# Patient Record
Sex: FEMALE | Race: BLACK OR AFRICAN AMERICAN
[De-identification: names, ages, dates, MRNs, and addresses within clinical notes are randomized per-mention and may not be internally consistent; named-entity substitution may affect disease eponyms.]

---

## 2020-04-07 ENCOUNTER — HOSPITAL ENCOUNTER (OUTPATIENT)
Dept: HOSPITAL 76 - LAB.WCP | Age: 30
Discharge: HOME | End: 2020-04-07
Attending: ADVANCED PRACTICE MIDWIFE
Payer: MEDICAID

## 2020-04-07 DIAGNOSIS — Z3A.00: ICD-10-CM

## 2020-04-07 DIAGNOSIS — O20.0: Primary | ICD-10-CM

## 2020-04-07 PROCEDURE — 84702 CHORIONIC GONADOTROPIN TEST: CPT

## 2020-04-07 PROCEDURE — 36415 COLL VENOUS BLD VENIPUNCTURE: CPT

## 2020-04-09 ENCOUNTER — HOSPITAL ENCOUNTER (OUTPATIENT)
Dept: HOSPITAL 76 - LAB.WCP | Age: 30
Discharge: HOME | End: 2020-04-09
Attending: ADVANCED PRACTICE MIDWIFE
Payer: MEDICAID

## 2020-04-09 DIAGNOSIS — Z3A.00: ICD-10-CM

## 2020-04-09 DIAGNOSIS — O20.0: Primary | ICD-10-CM

## 2020-04-09 PROCEDURE — 36415 COLL VENOUS BLD VENIPUNCTURE: CPT

## 2020-04-09 PROCEDURE — 84702 CHORIONIC GONADOTROPIN TEST: CPT

## 2020-04-10 ENCOUNTER — HOSPITAL ENCOUNTER (EMERGENCY)
Dept: HOSPITAL 76 - ED | Age: 30
Discharge: HOME | End: 2020-04-10
Payer: MEDICAID

## 2020-04-10 VITALS — DIASTOLIC BLOOD PRESSURE: 75 MMHG | SYSTOLIC BLOOD PRESSURE: 125 MMHG

## 2020-04-10 DIAGNOSIS — O03.4: Primary | ICD-10-CM

## 2020-04-10 LAB
ALBUMIN DIAFP-MCNC: 4.6 G/DL (ref 3.2–5.5)
ALBUMIN/GLOB SERPL: 1.5 {RATIO} (ref 1–2.2)
ALP SERPL-CCNC: 63 IU/L (ref 42–121)
ALT SERPL W P-5'-P-CCNC: 15 IU/L (ref 10–60)
ANION GAP SERPL CALCULATED.4IONS-SCNC: 8 MMOL/L (ref 6–13)
AST SERPL W P-5'-P-CCNC: 21 IU/L (ref 10–42)
BASOPHILS NFR BLD AUTO: 0.1 10^3/UL (ref 0–0.1)
BASOPHILS NFR BLD AUTO: 0.4 %
BILIRUB BLD-MCNC: 1 MG/DL (ref 0.2–1)
BUN SERPL-MCNC: 10 MG/DL (ref 6–20)
CALCIUM UR-MCNC: 8.7 MG/DL (ref 8.5–10.3)
CHLORIDE SERPL-SCNC: 107 MMOL/L (ref 101–111)
CLARITY UR REFRACT.AUTO: (no result)
CO2 SERPL-SCNC: 21 MMOL/L (ref 21–32)
CREAT SERPLBLD-SCNC: 0.7 MG/DL (ref 0.4–1)
EOSINOPHIL # BLD AUTO: 0 10^3/UL (ref 0–0.7)
EOSINOPHIL NFR BLD AUTO: 0.1 %
ERYTHROCYTE [DISTWIDTH] IN BLOOD BY AUTOMATED COUNT: 14.3 % (ref 12–15)
GLOBULIN SER-MCNC: 3.1 G/DL (ref 2.1–4.2)
GLUCOSE SERPL-MCNC: 119 MG/DL (ref 70–100)
GLUCOSE UR QL STRIP.AUTO: NEGATIVE MG/DL
HGB UR QL STRIP: 12.9 G/DL (ref 12–16)
KETONES UR QL STRIP.AUTO: NEGATIVE MG/DL
LIPASE SERPL-CCNC: 48 U/L (ref 22–51)
LYMPHOCYTES # SPEC AUTO: 1.7 10^3/UL (ref 1.5–3.5)
LYMPHOCYTES NFR BLD AUTO: 12.4 %
MCH RBC QN AUTO: 28.4 PG (ref 27–31)
MCHC RBC AUTO-ENTMCNC: 33.2 G/DL (ref 32–36)
MCV RBC AUTO: 85.7 FL (ref 81–99)
MONOCYTES # BLD AUTO: 0.7 10^3/UL (ref 0–1)
MONOCYTES NFR BLD AUTO: 5 %
NEUTROPHILS # BLD AUTO: 11.3 10^3/UL (ref 1.5–6.6)
NEUTROPHILS # SNV AUTO: 13.8 X10^3/UL (ref 4.8–10.8)
NEUTROPHILS NFR BLD AUTO: 81.4 %
NITRITE UR QL STRIP.AUTO: NEGATIVE
PDW BLD AUTO: 10.3 FL (ref 7.9–10.8)
PH UR STRIP.AUTO: 8.5 PH (ref 5–7.5)
PLATELET # BLD: 287 10^3/UL (ref 130–450)
PROT SPEC-MCNC: 7.7 G/DL (ref 6.7–8.2)
PROT UR STRIP.AUTO-MCNC: NEGATIVE MG/DL
RBC # UR STRIP.AUTO: (no result) /UL
RBC # URNS HPF: (no result) /HPF (ref 0–5)
RBC MAR: 4.54 10^6/UL (ref 4.2–5.4)
SODIUM SERPLBLD-SCNC: 136 MMOL/L (ref 135–145)
SP GR UR STRIP.AUTO: 1.01 (ref 1–1.03)
SQUAMOUS URNS QL MICRO: (no result)
UROBILINOGEN UR QL STRIP.AUTO: (no result) E.U./DL
UROBILINOGEN UR STRIP.AUTO-MCNC: NEGATIVE MG/DL

## 2020-04-10 PROCEDURE — 96361 HYDRATE IV INFUSION ADD-ON: CPT

## 2020-04-10 PROCEDURE — 87086 URINE CULTURE/COLONY COUNT: CPT

## 2020-04-10 PROCEDURE — 83690 ASSAY OF LIPASE: CPT

## 2020-04-10 PROCEDURE — 36415 COLL VENOUS BLD VENIPUNCTURE: CPT

## 2020-04-10 PROCEDURE — 86900 BLOOD TYPING SEROLOGIC ABO: CPT

## 2020-04-10 PROCEDURE — 85025 COMPLETE CBC W/AUTO DIFF WBC: CPT

## 2020-04-10 PROCEDURE — 99284 EMERGENCY DEPT VISIT MOD MDM: CPT

## 2020-04-10 PROCEDURE — 86901 BLOOD TYPING SEROLOGIC RH(D): CPT

## 2020-04-10 PROCEDURE — 81003 URINALYSIS AUTO W/O SCOPE: CPT

## 2020-04-10 PROCEDURE — 81001 URINALYSIS AUTO W/SCOPE: CPT

## 2020-04-10 PROCEDURE — 76801 OB US < 14 WKS SINGLE FETUS: CPT

## 2020-04-10 PROCEDURE — 76817 TRANSVAGINAL US OBSTETRIC: CPT

## 2020-04-10 PROCEDURE — 96376 TX/PRO/DX INJ SAME DRUG ADON: CPT

## 2020-04-10 PROCEDURE — 84702 CHORIONIC GONADOTROPIN TEST: CPT

## 2020-04-10 PROCEDURE — 80053 COMPREHEN METABOLIC PANEL: CPT

## 2020-04-10 PROCEDURE — 96374 THER/PROPH/DIAG INJ IV PUSH: CPT

## 2020-04-10 NOTE — ED PHYSICIAN DOCUMENTATION
PD HPI FEMALE 





- Stated complaint


Stated Complaint: FEM 





- Chief complaint


Chief Complaint: Abd Pain





- History obtained from


History obtained from: Patient





- History of Present Illness


Timing - onset: How many days ago (few)


Timing - duration: Days (few)


Timing - details: Abrupt onset, Still present, Waxing and waning


Associated symptoms: Pelvic pain, Vaginal bleeding.  No: Fever, Vaginal 

discharge, Genital sore/lesion, Dysuria


Contributing factors: Pregnant (8 weeks by dates. She does have history of 

endometriosis and has been having issues with getting conception and was excited

about being pregnant.)


OB-GYN History: G (1), P (0)


Similar symptoms before: Has not had sx before


Recently seen: Clinic (She was seen in the clinic a few days ago with the onset 

of some cramping and spotting.  She had a quantitative hCG that was in the 400s.

 She had a repeat 1 yesterday that was 127.  She was having increased cramping 

and pain today and was referred by Dr. camacho to the ER for ultrasound and 

evaluation to ensure no ectopic or intra-abdominal bleeding and to better 

evaluate for the pregnancy status.)





Review of Systems


Constitutional: denies: Fever, Chills


Cardiac: denies: Chest pain / pressure


Respiratory: denies: Dyspnea, Cough


GI: reports: Abdominal Pain, Nausea (with the cramps).  denies: Vomiting, 

Diarrhea


: reports: Now pregnant EGA (8 wks).  denies: Dysuria, Discharge


Neurologic: reports: Generalized weakness.  denies: Near syncope, Syncope





PD PAST MEDICAL HISTORY





- Past Medical History


Cardiovascular: None


Respiratory: None


GYN: Endometriosis





- Present Medications


Home Medications: 


                                Ambulatory Orders











 Medication  Instructions  Recorded  Confirmed


 


Hydrocodone/Acetaminophen [Norco 1 each PO Q6H PRN #15 tablet 04/10/20 





5-325 Tablet]   


 


Naproxen 500 mg PO BID #20 tablet 04/10/20 


 


Ondansetron Odt [Zofran] 4 mg TL Q6H PRN #10 tablet 04/10/20 














- Allergies


Allergies/Adverse Reactions: 


                                    Allergies











Allergy/AdvReac Type Severity Reaction Status Date / Time


 


No Known Drug Allergies Allergy   Verified 04/10/20 11:36














PD ED PE NORMAL





- Vitals


Vital signs reviewed: Yes





- General


General: Alert and oriented X 3, Well developed/nourished, Other (Appears 

anxious and also tearful and in pain.)





- Cardiac


Cardiac: RRR (regular but tachycardic), No murmur





- Respiratory


Respiratory: Clear bilaterally





- Abdomen


Abdomen: Normal bowel sounds, Soft, Non distended, No organomegaly, Other (Some 

tenderness in the suprapubic area but no rebound or percussion tenderness.  The 

upper abdomen is nontender.)





- Female 


Female : Deferred





- Rectal


Rectal: Deferred





- Back


Back: No CVA TTP





- Derm


Derm: Normal color, Warm and dry





- Neuro


Neuro: Alert and oriented X 3, No motor deficit, Normal speech





- Psych


Psych: No: Normal mood (sad and tearful)





Results





- Vitals


Vitals: 


                               Vital Signs - 24 hr











  04/10/20 04/10/20 04/10/20





  11:36 12:01 12:30


 


Temperature 37.1 C  


 


Heart Rate 117 H 110 H 100


 


Respiratory 18  20





Rate   


 


Blood Pressure 140/104 H  115/93 H


 


O2 Saturation 100 99 100














  04/10/20





  15:28


 


Temperature 36.9 C


 


Heart Rate 89


 


Respiratory 





Rate 


 


Blood Pressure 125/75


 


O2 Saturation 100








                                     Oxygen











O2 Source                      Room air

















- Labs


Labs: 


                                Laboratory Tests











  04/10/20 04/10/20 04/10/20





  12:04 12:04 12:04


 


WBC  13.8 H  


 


RBC  4.54  


 


Hgb  12.9  


 


Hct  38.9  


 


MCV  85.7  


 


MCH  28.4  


 


MCHC  33.2  


 


RDW  14.3  


 


Plt Count  287  


 


MPV  10.3  


 


Neut # (Auto)  11.3 H  


 


Lymph # (Auto)  1.7  


 


Mono # (Auto)  0.7  


 


Eos # (Auto)  0.0  


 


Baso # (Auto)  0.1  


 


Absolute Nucleated RBC  0.00  


 


Nucleated RBC %  0.0  


 


Sodium   136 


 


Potassium   3.6 


 


Chloride   107 


 


Carbon Dioxide   21 


 


Anion Gap   8.0 


 


BUN   10 


 


Creatinine   0.7 


 


Estimated GFR (MDRD)   122 


 


Glucose   119 H 


 


Calcium   8.7 


 


Total Bilirubin   1.0 


 


AST   21 


 


ALT   15 


 


Alkaline Phosphatase   63 


 


Total Protein   7.7 


 


Albumin   4.6 


 


Globulin   3.1 


 


Albumin/Globulin Ratio   1.5 


 


Lipase   48 


 


HCG, Quant    79.80


 


Urine Color   


 


Urine Clarity   


 


Urine pH   


 


Ur Specific Gravity   


 


Urine Protein   


 


Urine Glucose (UA)   


 


Urine Ketones   


 


Urine Occult Blood   


 


Urine Nitrite   


 


Urine Bilirubin   


 


Urine Urobilinogen   


 


Ur Leukocyte Esterase   


 


Urine RBC   


 


Urine WBC   


 


Ur Squamous Epith Cells   


 


Urine Bacteria   


 


Ur Microscopic Review   


 


Urine Culture Comments   


 


Blood Type   














  04/10/20 04/10/20





  12:04 12:43


 


WBC  


 


RBC  


 


Hgb  


 


Hct  


 


MCV  


 


MCH  


 


MCHC  


 


RDW  


 


Plt Count  


 


MPV  


 


Neut # (Auto)  


 


Lymph # (Auto)  


 


Mono # (Auto)  


 


Eos # (Auto)  


 


Baso # (Auto)  


 


Absolute Nucleated RBC  


 


Nucleated RBC %  


 


Sodium  


 


Potassium  


 


Chloride  


 


Carbon Dioxide  


 


Anion Gap  


 


BUN  


 


Creatinine  


 


Estimated GFR (MDRD)  


 


Glucose  


 


Calcium  


 


Total Bilirubin  


 


AST  


 


ALT  


 


Alkaline Phosphatase  


 


Total Protein  


 


Albumin  


 


Globulin  


 


Albumin/Globulin Ratio  


 


Lipase  


 


HCG, Quant  


 


Urine Color  YELLOW 


 


Urine Clarity  HAZY 


 


Urine pH  8.5 H 


 


Ur Specific Gravity  1.015 


 


Urine Protein  NEGATIVE 


 


Urine Glucose (UA)  NEGATIVE 


 


Urine Ketones  NEGATIVE 


 


Urine Occult Blood  LARGE H 


 


Urine Nitrite  NEGATIVE 


 


Urine Bilirubin  NEGATIVE 


 


Urine Urobilinogen  0.2 (NORMAL) 


 


Ur Leukocyte Esterase  NEGATIVE 


 


Urine RBC  TNTC H 


 


Urine WBC  0-3 


 


Ur Squamous Epith Cells  RARE Squamous 


 


Urine Bacteria  Rare 


 


Ur Microscopic Review  INDICATED 


 


Urine Culture Comments  NOT INDICATED 


 


Blood Type   O POSITIVE














- Rads (name of study)


  ** OB U/S


Radiology: Prelim report reviewed (OB ultrasound both transabdominal and 

transvaginal showed no adnexal masses nor free fluid.  Blood flow to both 

ovaries was normal.  There was a small fluid sac in the uterus that could be 

consistent with a gestational sac measuring at 5 weeks 2 days.), See rad report





PD MEDICAL DECISION MAKING





- ED course


Complexity details: reviewed results (Ultrasound shows a likely intrauterine 

fetal demise at 5 weeks 2 days which is behind her dates of 8 weeks and would 

correspond with falling quant detailed of hCG levels as a an incomplete 

miscarriage.  No signs of adnexal abnormalities.  She was not on infertility 

medicines so would not expect multiple gestations.), considered differential (No

 adnexal abnormalities seen.  There is a possible intrauterine pregnancy 

gestational sac measuring 5 weeks 2 days.  This is 2-1/2 weeks behind her dates 

and in the setting of a falling hCG level and cramping and bleeding, would 

suggest a incomplete miscarriage.  No signs of ectopic.), d/w patient, d/w 

consultant (Talked with Dr. camacho who is on for OB/GYN.  At this point would 

treat with adequate fluids and anti-inflammatories and pain medicine and give 

more time for completion of the miscarriage.  The patient is comfortable with 

this and prefers that at this point rather than D&C.  )


ED course: 





She is cautioned about signs of excess bleeding or pain or any signs of 

infection to return for more urgent D&C.  Otherwise to follow-up with OB/GYN 

early next week.





Departure





- Departure


Disposition: 01 Home, Self Care


Clinical Impression: 


 Incomplete miscarriage with blood clot, Uterine pain





Condition: Stable


Record reviewed to determine appropriate education?: Yes


Instructions:  ED Miscarriage Incom


Follow-Up: 


Edmundo Ortiz MD [Provider Admit Priv/Credential] - 


Prescriptions: 


Hydrocodone/Acetaminophen [Norco 5-325 Tablet] 1 each PO Q6H PRN #15 tablet


 PRN Reason: Pain


Naproxen 500 mg PO BID #20 tablet


Ondansetron Odt [Zofran] 4 mg TL Q6H PRN #10 tablet


 PRN Reason: Nausea / Vomiting


Comments: 


Call the OB/GYN office today to arrange a follow-up for Monday or Tuesday.  Stay

 well-hydrated.  Use some anti-inflammatories such as naproxen twice daily.  Add

 ondansetron if needed for nausea.  Add Tylenol 4 times a day or hydrocodone if 

needed for worse pain and cramps.





Follow-up with OB/GYN early next week.  Return if worsening pain, persistent or 

increased bleeding, or any signs of infection such as fever or purulent 

discharge.


Discharge Date/Time: 04/10/20 15:29

## 2020-04-10 NOTE — ULTRASOUND REPORT
Reason:  early pregnany and cramps

Procedure Date:  04/10/2020   

Accession Number:  [id number] / [id number]                    

Procedure:  US  - OB First Trimester CPT Code:  

 

***Final Report***

 

 

FULL RESULT:

 

 

EXAM:

FIRST TRIMESTER OBSTETRIC ULTRASOUND

(Less than 11 weeks)

 

EXAM DATE: 4/10/2020 01:09 PM.

 

CLINICAL HISTORY: Early pregnancy and cramps.

LMP: 02/16/2020 (approximately).

 

COMPARISONS: None.

 

TECHNIQUE: Transabdominal and transvaginal ultrasound examination with 

static image documentation.

 

CLINICAL DATES:

EGA 7 weeks 5 days with MARIELOS 11/22/2020 based on LMP.

 

ASSESSMENT:

Gestational Sac: There is a single tiny fluid collection within the 

endometrium which may represent an early gestational sac.  Mean 

gestational sac diameter: 5.0 mm = 5 weeks 2 days.

Embryo: Not visualized.

Cardiac activity: Not visualized.

Amniotic fluid: Not accurately assessed at this gestational age.

Early placenta: Not visible at this gestational age.

Other: No perigestational fluid collection demonstrated.

 

MATERNAL STRUCTURES:

Uterus: Anteverted. There is a posterior fundal intramural fibroid 

measuring 1.2 x 1.2 x 1.2 cm.

Cervix: Closed.

Right Ovary/Adnexa: The ovary measures 3.0 x 1.9 x 1.8 cm, volume 5.3 cc. 

Unremarkable.

Left Ovary/Adnexa: The ovary measures 2.8 x 1.0 x 2.0 cm, volume 2.9 cc.  

Unremarkable.

Free Fluid: None.

 

Other: None.

IMPRESSION:

Pregnancy of unknown location. There is a tiny intrauterine fluid 

collection, which could represent a gestational sac, cyst, or focal 

fluid. If this is a gestational sac, size would correspond to a 

gestational age of 5 weeks 2 days. Recommend close clinical follow-up and 

correlation with serial beta-hCG, and follow-up ultrasound if indicated.

 

[facility]

## 2020-10-05 ENCOUNTER — HOSPITAL ENCOUNTER (OUTPATIENT)
Dept: HOSPITAL 76 - LAB | Age: 30
Discharge: HOME | End: 2020-10-05
Attending: NURSE PRACTITIONER
Payer: MEDICAID

## 2020-10-05 DIAGNOSIS — F41.0: Primary | ICD-10-CM

## 2020-10-05 LAB
ALBUMIN DIAFP-MCNC: 4.6 G/DL (ref 3.2–5.5)
ALBUMIN/GLOB SERPL: 1.5 {RATIO} (ref 1–2.2)
ALP SERPL-CCNC: 55 IU/L (ref 42–121)
ALT SERPL W P-5'-P-CCNC: 13 IU/L (ref 10–60)
ANION GAP SERPL CALCULATED.4IONS-SCNC: 8 MMOL/L (ref 6–13)
AST SERPL W P-5'-P-CCNC: 18 IU/L (ref 10–42)
BASOPHILS NFR BLD AUTO: 0.1 10^3/UL (ref 0–0.1)
BASOPHILS NFR BLD AUTO: 0.7 %
BILIRUB BLD-MCNC: 1.5 MG/DL (ref 0.2–1)
BUN SERPL-MCNC: 13 MG/DL (ref 6–20)
CALCIUM UR-MCNC: 9.2 MG/DL (ref 8.5–10.3)
CHLORIDE SERPL-SCNC: 106 MMOL/L (ref 101–111)
CHOLEST SERPL-MCNC: 144 MG/DL
CO2 SERPL-SCNC: 24 MMOL/L (ref 21–32)
CREAT SERPLBLD-SCNC: 0.7 MG/DL (ref 0.4–1)
EOSINOPHIL # BLD AUTO: 0.1 10^3/UL (ref 0–0.7)
EOSINOPHIL NFR BLD AUTO: 1.2 %
ERYTHROCYTE [DISTWIDTH] IN BLOOD BY AUTOMATED COUNT: 13.8 % (ref 12–15)
GLOBULIN SER-MCNC: 3 G/DL (ref 2.1–4.2)
GLUCOSE SERPL-MCNC: 92 MG/DL (ref 70–100)
HDLC SERPL-MCNC: 42 MG/DL
HDLC SERPL: 3.4 {RATIO} (ref ?–4.4)
HGB UR QL STRIP: 12.7 G/DL (ref 12–16)
LDLC SERPL CALC-MCNC: 92 MG/DL
LDLC/HDLC SERPL: 2.2 {RATIO} (ref ?–4.4)
LYMPHOCYTES # SPEC AUTO: 1.8 10^3/UL (ref 1.5–3.5)
LYMPHOCYTES NFR BLD AUTO: 24.5 %
MCH RBC QN AUTO: 28.5 PG (ref 27–31)
MCHC RBC AUTO-ENTMCNC: 33.2 G/DL (ref 32–36)
MCV RBC AUTO: 85.8 FL (ref 81–99)
MONOCYTES # BLD AUTO: 0.6 10^3/UL (ref 0–1)
MONOCYTES NFR BLD AUTO: 7.8 %
NEUTROPHILS # BLD AUTO: 4.9 10^3/UL (ref 1.5–6.6)
NEUTROPHILS # SNV AUTO: 7.5 X10^3/UL (ref 4.8–10.8)
NEUTROPHILS NFR BLD AUTO: 65.4 %
PDW BLD AUTO: 10.3 FL (ref 7.9–10.8)
PLATELET # BLD: 257 10^3/UL (ref 130–450)
PROT SPEC-MCNC: 7.6 G/DL (ref 6.7–8.2)
RBC MAR: 4.45 10^6/UL (ref 4.2–5.4)
SODIUM SERPLBLD-SCNC: 138 MMOL/L (ref 135–145)
VLDLC SERPL-SCNC: 10 MG/DL

## 2020-10-05 PROCEDURE — 85025 COMPLETE CBC W/AUTO DIFF WBC: CPT

## 2020-10-05 PROCEDURE — 80061 LIPID PANEL: CPT

## 2020-10-05 PROCEDURE — 82306 VITAMIN D 25 HYDROXY: CPT

## 2020-10-05 PROCEDURE — 83721 ASSAY OF BLOOD LIPOPROTEIN: CPT

## 2020-10-05 PROCEDURE — 80053 COMPREHEN METABOLIC PANEL: CPT

## 2020-10-05 PROCEDURE — 84443 ASSAY THYROID STIM HORMONE: CPT

## 2020-10-05 PROCEDURE — 36415 COLL VENOUS BLD VENIPUNCTURE: CPT

## 2020-10-09 ENCOUNTER — HOSPITAL ENCOUNTER (OUTPATIENT)
Dept: HOSPITAL 76 - DI | Age: 30
Discharge: HOME | End: 2020-10-09
Attending: NURSE PRACTITIONER
Payer: MEDICAID

## 2020-10-09 DIAGNOSIS — R01.1: Primary | ICD-10-CM

## 2020-10-09 DIAGNOSIS — F41.0: ICD-10-CM

## 2020-10-09 PROCEDURE — 93306 TTE W/DOPPLER COMPLETE: CPT

## 2022-03-11 ENCOUNTER — HOSPITAL ENCOUNTER (OUTPATIENT)
Dept: HOSPITAL 76 - LAB.N | Age: 32
Discharge: HOME | End: 2022-03-11
Attending: PHYSICIAN ASSISTANT
Payer: MEDICAID

## 2022-03-11 DIAGNOSIS — Z11.3: Primary | ICD-10-CM

## 2022-03-11 PROCEDURE — 86592 SYPHILIS TEST NON-TREP QUAL: CPT

## 2022-03-11 PROCEDURE — 81599 UNLISTED MAAA: CPT

## 2022-03-11 PROCEDURE — 87591 N.GONORRHOEAE DNA AMP PROB: CPT

## 2022-03-11 PROCEDURE — 87389 HIV-1 AG W/HIV-1&-2 AB AG IA: CPT

## 2022-03-11 PROCEDURE — 87491 CHLMYD TRACH DNA AMP PROBE: CPT

## 2022-03-11 PROCEDURE — 86803 HEPATITIS C AB TEST: CPT

## 2022-03-11 PROCEDURE — 36415 COLL VENOUS BLD VENIPUNCTURE: CPT

## 2022-03-11 PROCEDURE — 86696 HERPES SIMPLEX TYPE 2 TEST: CPT

## 2022-03-11 PROCEDURE — 87661 TRICHOMONAS VAGINALIS AMPLIF: CPT

## 2022-03-11 PROCEDURE — 86695 HERPES SIMPLEX TYPE 1 TEST: CPT

## 2022-03-12 LAB
C TRACH DNA SPEC NAA+PROBE-ACNC: NEGATIVE
N GONORRHOEA DNA GENITAL QL NAA+PROBE: NEGATIVE
T VAGINALIS RRNA GENITAL QL PROBE: NEGATIVE

## 2022-03-14 LAB
HEPATITIS C ANTIBODY: (no result)
HIV AG/AB 4TH GEN: (no result)
SIGNAL TO CUT-OFF: 0 (ref ?–1)

## 2022-03-15 LAB
HSV 1 IGG TYPE SPECIFIC AB: <0.9 INDEX
HSV 2 IGG TYPE SPECIFIC AB: <0.9 INDEX